# Patient Record
Sex: FEMALE | NOT HISPANIC OR LATINO | URBAN - METROPOLITAN AREA
[De-identification: names, ages, dates, MRNs, and addresses within clinical notes are randomized per-mention and may not be internally consistent; named-entity substitution may affect disease eponyms.]

---

## 2023-09-24 LAB — GROUP A STREP, PCR: NOT DETECTED

## 2024-07-23 ENCOUNTER — APPOINTMENT (OUTPATIENT)
Dept: PRIMARY CARE | Facility: CLINIC | Age: 49
End: 2024-07-23
Payer: COMMERCIAL

## 2024-07-23 VITALS
SYSTOLIC BLOOD PRESSURE: 138 MMHG | DIASTOLIC BLOOD PRESSURE: 90 MMHG | WEIGHT: 205 LBS | BODY MASS INDEX: 36.32 KG/M2 | HEIGHT: 63 IN

## 2024-07-23 DIAGNOSIS — R53.83 OTHER FATIGUE: ICD-10-CM

## 2024-07-23 DIAGNOSIS — D64.9 ANEMIA, UNSPECIFIED TYPE: ICD-10-CM

## 2024-07-23 DIAGNOSIS — Z13.220 LIPID SCREENING: ICD-10-CM

## 2024-07-23 DIAGNOSIS — E11.9 TYPE 2 DIABETES MELLITUS WITHOUT COMPLICATION, WITHOUT LONG-TERM CURRENT USE OF INSULIN (MULTI): ICD-10-CM

## 2024-07-23 DIAGNOSIS — R63.5 WEIGHT GAIN: ICD-10-CM

## 2024-07-23 DIAGNOSIS — R73.03 PRE-DIABETES: ICD-10-CM

## 2024-07-23 DIAGNOSIS — G47.30 SLEEP APNEA, UNSPECIFIED TYPE: ICD-10-CM

## 2024-07-23 DIAGNOSIS — Z12.31 VISIT FOR SCREENING MAMMOGRAM: Primary | ICD-10-CM

## 2024-07-23 DIAGNOSIS — Z83.49 FAMILY HISTORY OF HEMOCHROMATOSIS: ICD-10-CM

## 2024-07-23 PROCEDURE — 3075F SYST BP GE 130 - 139MM HG: CPT | Performed by: INTERNAL MEDICINE

## 2024-07-23 PROCEDURE — 99204 OFFICE O/P NEW MOD 45 MIN: CPT | Performed by: INTERNAL MEDICINE

## 2024-07-23 PROCEDURE — 3080F DIAST BP >= 90 MM HG: CPT | Performed by: INTERNAL MEDICINE

## 2024-07-23 PROCEDURE — 3008F BODY MASS INDEX DOCD: CPT | Performed by: INTERNAL MEDICINE

## 2024-07-23 ASSESSMENT — ENCOUNTER SYMPTOMS
OCCASIONAL FEELINGS OF UNSTEADINESS: 0
LOSS OF SENSATION IN FEET: 0
DEPRESSION: 0

## 2024-07-24 NOTE — PROGRESS NOTES
"Subjective   Patient ID: Corinne Padilla is a 49 y.o. female who presents for Follow-up (Multiple medical issues).    Corinne Padilla is a 49-year-old white lady today came here for multiple medical issues.  She snores at night, increased daytime somnolence, diabetes.  She took Wegovy for two months, lost seven pounds, but her insurance changed rule of the game.  She is unhappy with it.    IMMUNIZATION: Tetanus within the last 10 years.    I have personally reviewed the patient's Past Medical History, Medications, Allergies, Social History, and Family History in the EMR.    Review of Systems   All other systems reviewed and are negative.  The patient never had a heart attack, stroke, diabetes or caner.    Objective   /90   Ht 1.6 m (5' 3\")   Wt 93 kg (205 lb)   BMI 36.31 kg/m²     Physical Exam  Vitals reviewed.   HENT:      Right Ear: Tympanic membrane, ear canal and external ear normal.      Left Ear: Tympanic membrane, ear canal and external ear normal.   Eyes:      General: No scleral icterus.     Pupils: Pupils are equal, round, and reactive to light.   Neck:      Vascular: No carotid bruit.   Cardiovascular:      Heart sounds: Normal heart sounds, S1 normal and S2 normal. No murmur heard.     No friction rub.   Pulmonary:      Effort: Pulmonary effort is normal.      Breath sounds: Normal breath sounds and air entry.   Chest:      Comments: BREAST: Deferred by the patient.  Abdominal:      Palpations: There is no hepatomegaly, splenomegaly or mass.   Genitourinary:     Comments: VAGINAL: Deferred by the patient.  RECTAL: Deferred by the patient.  Musculoskeletal:         General: No swelling or deformity. Normal range of motion.      Cervical back: Neck supple.      Right lower leg: No edema.      Left lower leg: No edema.   Lymphadenopathy:      Cervical: No cervical adenopathy.      Upper Body:      Right upper body: No axillary adenopathy.      Left upper body: No axillary adenopathy.      Lower " Body: No right inguinal adenopathy. No left inguinal adenopathy.   Neurological:      Mental Status: She is oriented to person, place, and time.      Cranial Nerves: Cranial nerves 2-12 are intact. No cranial nerve deficit.      Sensory: No sensory deficit.      Motor: Motor function is intact. No weakness.      Gait: Gait is intact.      Deep Tendon Reflexes: Reflexes normal.   Psychiatric:         Mood and Affect: Mood normal. Mood is not anxious or depressed. Affect is not angry.         Behavior: Behavior is not agitated.         Thought Content: Thought content normal.         Judgment: Judgment normal.     LAB WORK: Laboratory testing discussed.    Assessment/Plan   Problem List Items Addressed This Visit    None  Visit Diagnoses         Codes    Visit for screening mammogram    -  Primary Z12.31    Relevant Orders    BI mammo bilateral screening tomosynthesis    Other fatigue     R53.83    Relevant Orders    CBC    Vitamin B12    Urinalysis with Reflex Microscopic    Thyroid Stimulating Hormone    Family history of hemochromatosis     Z83.49    Relevant Orders    Hemochromatosis Mutation    Lipid screening     Z13.220    Relevant Orders    Comprehensive Metabolic Panel    Lipid Panel    Pre-diabetes     R73.03    Relevant Orders    Hemoglobin A1C    BMI 36.0-36.9,adult     Z68.36    Relevant Orders    Vitamin D 25-Hydroxy,Total (for eval of Vitamin D levels)    Anemia, unspecified type     D64.9    Relevant Orders    Iron    Vitamin B12    Sleep apnea, unspecified type     G47.30    Weight gain     R63.5    Type 2 diabetes mellitus without complication, without long-term current use of insulin (Multi)     E11.9        1. Home sleep apnea.  Sleep apnea test ordered, home test.  2. Weight gain, tired, fatigued, exhausted.  I ordered fasting sugar level.  3. Family history of hemochromatosis.  Gene mutation ordered with [facility].  4. Complete blood work ordered.  5. Type 2 diabetes.  Hemoglobin  A1c.  6. Follow-up in a week after tests.  7. Welcome to my office.  8. Gynecological.  She will go for Pap test, mammogram.  Monitor.  9. Follow-up appointment with me in a week after testing.    Scribe Attestation  By signing my name below, I, Saima Rivera attest that this documentation has been prepared under the direction and in the presence of Kingston Beckett MD.

## 2024-07-26 ENCOUNTER — LAB (OUTPATIENT)
Dept: LAB | Facility: LAB | Age: 49
End: 2024-07-26
Payer: COMMERCIAL

## 2024-07-26 DIAGNOSIS — D64.9 ANEMIA, UNSPECIFIED TYPE: ICD-10-CM

## 2024-07-26 DIAGNOSIS — Z83.49 FAMILY HISTORY OF HEMOCHROMATOSIS: ICD-10-CM

## 2024-07-26 DIAGNOSIS — R73.03 PRE-DIABETES: ICD-10-CM

## 2024-07-26 DIAGNOSIS — R53.83 OTHER FATIGUE: ICD-10-CM

## 2024-07-26 DIAGNOSIS — Z13.220 LIPID SCREENING: ICD-10-CM

## 2024-07-26 LAB
25(OH)D3 SERPL-MCNC: 24 NG/ML (ref 30–100)
ALBUMIN SERPL BCP-MCNC: 4.7 G/DL (ref 3.4–5)
ALP SERPL-CCNC: 82 U/L (ref 33–110)
ALT SERPL W P-5'-P-CCNC: 39 U/L (ref 7–45)
ANION GAP SERPL CALC-SCNC: 18 MMOL/L (ref 10–20)
APPEARANCE UR: CLEAR
AST SERPL W P-5'-P-CCNC: 35 U/L (ref 9–39)
BILIRUB SERPL-MCNC: 0.5 MG/DL (ref 0–1.2)
BILIRUB UR STRIP.AUTO-MCNC: NEGATIVE MG/DL
BUN SERPL-MCNC: 10 MG/DL (ref 6–23)
CALCIUM SERPL-MCNC: 9.8 MG/DL (ref 8.6–10.6)
CHLORIDE SERPL-SCNC: 99 MMOL/L (ref 98–107)
CHOLEST SERPL-MCNC: 260 MG/DL (ref 0–199)
CHOLESTEROL/HDL RATIO: 4.9
CO2 SERPL-SCNC: 25 MMOL/L (ref 21–32)
COLOR UR: NORMAL
CREAT SERPL-MCNC: 0.74 MG/DL (ref 0.5–1.05)
EGFRCR SERPLBLD CKD-EPI 2021: >90 ML/MIN/1.73M*2
ERYTHROCYTE [DISTWIDTH] IN BLOOD BY AUTOMATED COUNT: 12.1 % (ref 11.5–14.5)
EST. AVERAGE GLUCOSE BLD GHB EST-MCNC: 177 MG/DL
GLUCOSE SERPL-MCNC: 182 MG/DL (ref 74–99)
GLUCOSE UR STRIP.AUTO-MCNC: NORMAL MG/DL
HBA1C MFR BLD: 7.8 %
HCT VFR BLD AUTO: 41 % (ref 36–46)
HDLC SERPL-MCNC: 53.3 MG/DL
HGB BLD-MCNC: 14 G/DL (ref 12–16)
IRON SERPL-MCNC: 124 UG/DL (ref 35–150)
KETONES UR STRIP.AUTO-MCNC: NEGATIVE MG/DL
LDLC SERPL CALC-MCNC: ABNORMAL MG/DL
LEUKOCYTE ESTERASE UR QL STRIP.AUTO: NEGATIVE
MCH RBC QN AUTO: 31 PG (ref 26–34)
MCHC RBC AUTO-ENTMCNC: 34.1 G/DL (ref 32–36)
MCV RBC AUTO: 91 FL (ref 80–100)
NITRITE UR QL STRIP.AUTO: NEGATIVE
NON HDL CHOLESTEROL: 207 MG/DL (ref 0–149)
NRBC BLD-RTO: 0 /100 WBCS (ref 0–0)
PH UR STRIP.AUTO: 5.5 [PH]
PLATELET # BLD AUTO: 368 X10*3/UL (ref 150–450)
POTASSIUM SERPL-SCNC: 4.6 MMOL/L (ref 3.5–5.3)
PROT SERPL-MCNC: 7.5 G/DL (ref 6.4–8.2)
PROT UR STRIP.AUTO-MCNC: NEGATIVE MG/DL
RBC # BLD AUTO: 4.52 X10*6/UL (ref 4–5.2)
RBC # UR STRIP.AUTO: NEGATIVE /UL
SODIUM SERPL-SCNC: 137 MMOL/L (ref 136–145)
SP GR UR STRIP.AUTO: 1.01
TRIGL SERPL-MCNC: 476 MG/DL (ref 0–149)
TSH SERPL-ACNC: 2.71 MIU/L (ref 0.44–3.98)
UROBILINOGEN UR STRIP.AUTO-MCNC: NORMAL MG/DL
VIT B12 SERPL-MCNC: 356 PG/ML (ref 211–911)
VLDL: ABNORMAL
WBC # BLD AUTO: 8 X10*3/UL (ref 4.4–11.3)

## 2024-07-26 PROCEDURE — 83540 ASSAY OF IRON: CPT

## 2024-07-26 PROCEDURE — 82306 VITAMIN D 25 HYDROXY: CPT

## 2024-07-26 PROCEDURE — 82607 VITAMIN B-12: CPT

## 2024-07-26 PROCEDURE — 85027 COMPLETE CBC AUTOMATED: CPT

## 2024-07-26 PROCEDURE — 81003 URINALYSIS AUTO W/O SCOPE: CPT

## 2024-07-26 PROCEDURE — 84443 ASSAY THYROID STIM HORMONE: CPT

## 2024-07-26 PROCEDURE — 80053 COMPREHEN METABOLIC PANEL: CPT

## 2024-07-26 PROCEDURE — 80061 LIPID PANEL: CPT

## 2024-07-26 PROCEDURE — 36415 COLL VENOUS BLD VENIPUNCTURE: CPT

## 2024-07-26 PROCEDURE — 83036 HEMOGLOBIN GLYCOSYLATED A1C: CPT

## 2024-07-26 PROCEDURE — 81256 HFE GENE: CPT

## 2024-07-29 ENCOUNTER — HOSPITAL ENCOUNTER (OUTPATIENT)
Dept: RADIOLOGY | Facility: CLINIC | Age: 49
Discharge: HOME | End: 2024-07-29
Payer: COMMERCIAL

## 2024-07-29 ENCOUNTER — APPOINTMENT (OUTPATIENT)
Dept: PRIMARY CARE | Facility: CLINIC | Age: 49
End: 2024-07-29
Payer: COMMERCIAL

## 2024-07-29 VITALS — BODY MASS INDEX: 36.32 KG/M2 | WEIGHT: 205 LBS | HEIGHT: 63 IN

## 2024-07-29 VITALS
SYSTOLIC BLOOD PRESSURE: 130 MMHG | HEIGHT: 63 IN | DIASTOLIC BLOOD PRESSURE: 86 MMHG | BODY MASS INDEX: 36.14 KG/M2 | WEIGHT: 204 LBS

## 2024-07-29 DIAGNOSIS — Z12.31 VISIT FOR SCREENING MAMMOGRAM: ICD-10-CM

## 2024-07-29 DIAGNOSIS — E66.3 OVERWEIGHT: Primary | ICD-10-CM

## 2024-07-29 DIAGNOSIS — E11.9 TYPE 2 DIABETES MELLITUS WITHOUT COMPLICATION, WITHOUT LONG-TERM CURRENT USE OF INSULIN (MULTI): ICD-10-CM

## 2024-07-29 DIAGNOSIS — E83.110 HEREDITARY HEMOCHROMATOSIS (CMS-HCC): ICD-10-CM

## 2024-07-29 PROBLEM — F32.A DEPRESSIVE DISORDER: Status: ACTIVE | Noted: 2024-07-29

## 2024-07-29 PROBLEM — E66.9 OBESITY WITH BODY MASS INDEX 30 OR GREATER: Status: ACTIVE | Noted: 2024-07-29

## 2024-07-29 PROBLEM — R87.810 HIGH-RISK HUMAN PAPILLOMAVIRUS (HPV) DNA DETECTED IN CERVICAL SPECIMEN: Status: ACTIVE | Noted: 2024-07-29

## 2024-07-29 PROBLEM — N87.0 CERVICAL INTRAEPITHELIAL NEOPLASIA GRADE 1: Status: ACTIVE | Noted: 2024-07-29

## 2024-07-29 LAB
ELECTRONICALLY SIGNED BY: ABNORMAL
HFE GENE MUT TESTED BLD/T: ABNORMAL
HFE P.C282Y BLD/T QL: ABNORMAL
HFE P.H63D BLD/T QL: ABNORMAL

## 2024-07-29 PROCEDURE — 3079F DIAST BP 80-89 MM HG: CPT | Performed by: INTERNAL MEDICINE

## 2024-07-29 PROCEDURE — 77067 SCR MAMMO BI INCL CAD: CPT

## 2024-07-29 PROCEDURE — 99214 OFFICE O/P EST MOD 30 MIN: CPT | Performed by: INTERNAL MEDICINE

## 2024-07-29 PROCEDURE — 77067 SCR MAMMO BI INCL CAD: CPT | Performed by: STUDENT IN AN ORGANIZED HEALTH CARE EDUCATION/TRAINING PROGRAM

## 2024-07-29 PROCEDURE — 3008F BODY MASS INDEX DOCD: CPT | Performed by: INTERNAL MEDICINE

## 2024-07-29 PROCEDURE — 3051F HG A1C>EQUAL 7.0%<8.0%: CPT | Performed by: INTERNAL MEDICINE

## 2024-07-29 PROCEDURE — 3075F SYST BP GE 130 - 139MM HG: CPT | Performed by: INTERNAL MEDICINE

## 2024-07-29 PROCEDURE — 77063 BREAST TOMOSYNTHESIS BI: CPT | Performed by: STUDENT IN AN ORGANIZED HEALTH CARE EDUCATION/TRAINING PROGRAM

## 2024-07-29 RX ORDER — LANCETS
EACH MISCELLANEOUS
Qty: 200 EACH | Refills: 3 | Status: SHIPPED | OUTPATIENT
Start: 2024-07-29

## 2024-07-29 RX ORDER — INSULIN PUMP SYRINGE, 3 ML
EACH MISCELLANEOUS
Qty: 1 EACH | Refills: 0 | Status: SHIPPED | OUTPATIENT
Start: 2024-07-29 | End: 2025-07-29

## 2024-07-29 RX ORDER — BLOOD SUGAR DIAGNOSTIC
STRIP MISCELLANEOUS
Qty: 200 EACH | Refills: 3 | Status: SHIPPED | OUTPATIENT
Start: 2024-07-29

## 2024-07-29 ASSESSMENT — ENCOUNTER SYMPTOMS
OCCASIONAL FEELINGS OF UNSTEADINESS: 0
LOSS OF SENSATION IN FEET: 0
DEPRESSION: 0

## 2024-07-30 DIAGNOSIS — E11.9 TYPE 2 DIABETES MELLITUS WITHOUT COMPLICATION, WITHOUT LONG-TERM CURRENT USE OF INSULIN (MULTI): ICD-10-CM

## 2024-07-30 RX ORDER — BLOOD-GLUCOSE,RECEIVER,CONT
EACH MISCELLANEOUS
Qty: 1 EACH | Refills: 0 | Status: SHIPPED | OUTPATIENT
Start: 2024-07-30

## 2024-07-30 RX ORDER — BLOOD-GLUCOSE SENSOR
EACH MISCELLANEOUS
Qty: 5 EACH | Refills: 0 | Status: SHIPPED | OUTPATIENT
Start: 2024-07-30

## 2024-07-30 NOTE — PROGRESS NOTES
"Subjective   Patient ID: Corinne Elizondo is a 49 y.o. female who presents for multiple medical issues..    Ms. Elizondo today came here for multiple medical issues.  She came here with her .  1. She gained weight ______ diabetic.  She is concerned.  Polydipsia and polyuria.  Not feeling good.  2. Follow-up on other conditions.  Her mother has hemochromatosis.  She was diagnosed.    I have personally reviewed the patient's Past Medical History, Medications, Allergies, Social History, and Family History in the EMR.    Review of Systems   All other systems reviewed and are negative.    Objective   /86   Ht 1.6 m (5' 3\")   Wt 92.5 kg (204 lb)   BMI 36.14 kg/m²     Physical Exam  Vitals reviewed.   Cardiovascular:      Heart sounds: Normal heart sounds, S1 normal and S2 normal. No murmur heard.     No friction rub.   Pulmonary:      Effort: Pulmonary effort is normal.      Breath sounds: Normal breath sounds and air entry.   Abdominal:      Palpations: There is no hepatomegaly, splenomegaly or mass.   Musculoskeletal:      Right lower leg: No edema.      Left lower leg: No edema.   Lymphadenopathy:      Lower Body: No right inguinal adenopathy. No left inguinal adenopathy.   Neurological:      Cranial Nerves: Cranial nerves 2-12 are intact.      Sensory: No sensory deficit.      Motor: Motor function is intact.      Deep Tendon Reflexes: Reflexes are normal and symmetric.     LAB WORK:  Laboratory testing discussed.    Assessment/Plan   Problem List Items Addressed This Visit    None  Visit Diagnoses         Codes    Overweight    -  Primary E66.3    Hereditary hemochromatosis (CMS-HCC)     E83.110    Relevant Medications    lancets misc    Blood glucose monitoring meter kit kit    FreeStyle Test strip    semaglutide 0.25 mg or 0.5 mg (2 mg/3 mL) pen injector (Start on 8/4/2024)    Other Relevant Orders    Referral to Hematology and Oncology    Type 2 diabetes mellitus without complication, without " long-term current use of insulin (Multi)     E11.9    Relevant Medications    lancets misc    Blood glucose monitoring meter kit kit    FreeStyle Test strip    semaglutide 0.25 mg or 0.5 mg (2 mg/3 mL) pen injector (Start on 8/4/2024)    Other Relevant Orders    Referral to Hematology and Oncology        1. Hemochromatosis, heterozygous.  Refer to hematologist.  2. Type 2 diabetes.  She tried diet and exercise.  Metformin did not work.  She wants to try Ozempic to lose weight.  I am going to call her Ozempic.  3. Overweight.  Diet and exercise.  4. Follow-up appointment with me in four weeks’ time.    Scribe Attestation  By signing my name below, I, Saima Azar attest that this documentation has been prepared under the direction and in the presence of Kingston Beckett MD.

## 2024-08-07 ENCOUNTER — OFFICE VISIT (OUTPATIENT)
Dept: PRIMARY CARE | Facility: CLINIC | Age: 49
End: 2024-08-07
Payer: COMMERCIAL

## 2024-08-07 VITALS
SYSTOLIC BLOOD PRESSURE: 128 MMHG | WEIGHT: 204 LBS | BODY MASS INDEX: 36.14 KG/M2 | HEIGHT: 63 IN | DIASTOLIC BLOOD PRESSURE: 74 MMHG

## 2024-08-07 DIAGNOSIS — E11.9 TYPE 2 DIABETES MELLITUS WITHOUT COMPLICATION, WITHOUT LONG-TERM CURRENT USE OF INSULIN (MULTI): ICD-10-CM

## 2024-08-07 DIAGNOSIS — E55.9 VITAMIN D DEFICIENCY: Primary | ICD-10-CM

## 2024-08-07 PROCEDURE — 3078F DIAST BP <80 MM HG: CPT | Performed by: INTERNAL MEDICINE

## 2024-08-07 PROCEDURE — 3074F SYST BP LT 130 MM HG: CPT | Performed by: INTERNAL MEDICINE

## 2024-08-07 PROCEDURE — 3051F HG A1C>EQUAL 7.0%<8.0%: CPT | Performed by: INTERNAL MEDICINE

## 2024-08-07 PROCEDURE — 3008F BODY MASS INDEX DOCD: CPT | Performed by: INTERNAL MEDICINE

## 2024-08-07 PROCEDURE — 99213 OFFICE O/P EST LOW 20 MIN: CPT | Performed by: INTERNAL MEDICINE

## 2024-08-07 ASSESSMENT — ENCOUNTER SYMPTOMS
DEPRESSION: 0
LOSS OF SENSATION IN FEET: 0
OCCASIONAL FEELINGS OF UNSTEADINESS: 0

## 2024-08-08 NOTE — PROGRESS NOTES
"Subjective   Patient ID: Corinne Elizondo is a 49 y.o. female who presents for type 2 diabetes.    Ms. Corinne Elizondo today came here for multiple medical issues.  She is type 2 diabetic.  Her insurance will not cover Ozempic, but she came for Rybelsus, she wants to try it.  We did discuss option of metformin, but because of diarrhea and her job, she does not want to take a chance.  She wants to go on Rybelsus.  She has taken Wegovy last year and she was very happy with that.  She came for follow-up on various conditions.    I have personally reviewed the patient's Past Medical History, Medications, Allergies, Social History, and Family History in the EMR.    Review of Systems   All other systems reviewed and are negative.    Objective   /74   Ht 1.6 m (5' 3\")   Wt 92.5 kg (204 lb)   BMI 36.14 kg/m²     Physical Exam  Vitals reviewed.   Cardiovascular:      Heart sounds: Normal heart sounds, S1 normal and S2 normal. No murmur heard.     No friction rub.   Pulmonary:      Effort: Pulmonary effort is normal.      Breath sounds: Normal breath sounds and air entry.   Abdominal:      Palpations: There is no hepatomegaly, splenomegaly or mass.   Musculoskeletal:      Right lower leg: No edema.      Left lower leg: No edema.   Lymphadenopathy:      Lower Body: No right inguinal adenopathy. No left inguinal adenopathy.   Neurological:      Cranial Nerves: Cranial nerves 2-12 are intact.      Sensory: No sensory deficit.      Motor: Motor function is intact.      Deep Tendon Reflexes: Reflexes are normal and symmetric.     LAB WORK: Laboratory testing discussed.    Assessment/Plan   Problem List Items Addressed This Visit    None  Visit Diagnoses         Codes    Vitamin D deficiency    -  Primary E55.9    Type 2 diabetes mellitus without complication, without long-term current use of insulin (Multi)     E11.9    Relevant Medications    semaglutide (Rybelsus) 3 mg tablet    semaglutide (Rybelsus) 3 mg tablet      "   1. Type 2 diabetes and weight gain.  Insurance approved Rybelsus, given Rybelsus.  We talked about probably metformin.  Because of diarrhea and stuff, she does not want to take.  2. Low vitamin D.  Given.  3. Correct method given, sample dispensed.  4. I shall see her back in a month.    Scribe Attestation  By signing my name below, I, Saima Azar attest that this documentation has been prepared under the direction and in the presence of Kingston Beckett MD.

## 2024-08-09 DIAGNOSIS — E11.9 TYPE 2 DIABETES MELLITUS WITHOUT COMPLICATION, WITHOUT LONG-TERM CURRENT USE OF INSULIN (MULTI): ICD-10-CM

## 2024-08-27 ENCOUNTER — APPOINTMENT (OUTPATIENT)
Dept: PRIMARY CARE | Facility: CLINIC | Age: 49
End: 2024-08-27
Payer: COMMERCIAL

## 2024-09-04 ENCOUNTER — APPOINTMENT (OUTPATIENT)
Dept: PRIMARY CARE | Facility: CLINIC | Age: 49
End: 2024-09-04
Payer: COMMERCIAL

## 2024-09-04 DIAGNOSIS — E11.9 TYPE 2 DIABETES MELLITUS WITHOUT COMPLICATION, WITHOUT LONG-TERM CURRENT USE OF INSULIN (MULTI): ICD-10-CM

## 2024-09-13 ENCOUNTER — APPOINTMENT (OUTPATIENT)
Dept: PRIMARY CARE | Facility: CLINIC | Age: 49
End: 2024-09-13
Payer: COMMERCIAL

## 2024-09-20 ENCOUNTER — APPOINTMENT (OUTPATIENT)
Dept: PRIMARY CARE | Facility: CLINIC | Age: 49
End: 2024-09-20
Payer: COMMERCIAL

## 2024-10-24 ENCOUNTER — APPOINTMENT (OUTPATIENT)
Dept: PRIMARY CARE | Facility: CLINIC | Age: 49
End: 2024-10-24
Payer: COMMERCIAL

## 2024-11-07 ENCOUNTER — TELEPHONE (OUTPATIENT)
Dept: HEMATOLOGY/ONCOLOGY | Facility: CLINIC | Age: 49
End: 2024-11-07

## 2024-11-08 ENCOUNTER — TELEPHONE (OUTPATIENT)
Dept: HEMATOLOGY/ONCOLOGY | Facility: CLINIC | Age: 49
End: 2024-11-08
Payer: COMMERCIAL

## 2024-11-13 ENCOUNTER — APPOINTMENT (OUTPATIENT)
Dept: HEMATOLOGY/ONCOLOGY | Facility: CLINIC | Age: 49
End: 2024-11-13
Payer: COMMERCIAL

## 2024-11-13 DIAGNOSIS — E83.110 HEREDITARY HEMOCHROMATOSIS (CMS-HCC): Primary | ICD-10-CM

## 2024-11-22 ENCOUNTER — HOSPITAL ENCOUNTER (OUTPATIENT)
Dept: CARDIOLOGY | Facility: HOSPITAL | Age: 49
Discharge: HOME | End: 2024-11-22
Payer: COMMERCIAL

## 2024-11-22 ENCOUNTER — HOSPITAL ENCOUNTER (OUTPATIENT)
Dept: RADIOLOGY | Facility: HOSPITAL | Age: 49
Discharge: HOME | End: 2024-11-22
Payer: COMMERCIAL

## 2024-11-22 DIAGNOSIS — E83.110 HEREDITARY HEMOCHROMATOSIS (CMS-HCC): ICD-10-CM

## 2024-11-22 LAB
AORTIC VALVE PEAK VELOCITY: 1.05 M/S
AV PEAK GRADIENT: 4 MMHG
AVA (PEAK VEL): 2.81 CM2
EJECTION FRACTION APICAL 4 CHAMBER: 60.3
EJECTION FRACTION: 65 %
LEFT ATRIUM VOLUME AREA LENGTH INDEX BSA: 18 ML/M2
LEFT VENTRICLE INTERNAL DIMENSION DIASTOLE: 3.6 CM (ref 3.5–6)
LEFT VENTRICULAR OUTFLOW TRACT DIAMETER: 2.1 CM
MITRAL VALVE E/A RATIO: 0.91
RIGHT VENTRICLE FREE WALL PEAK S': 11.2 CM/S

## 2024-11-22 PROCEDURE — 2550000001 HC RX 255 CONTRASTS: Performed by: INTERNAL MEDICINE

## 2024-11-22 PROCEDURE — 93306 TTE W/DOPPLER COMPLETE: CPT

## 2024-11-22 PROCEDURE — 74183 MRI ABD W/O CNTR FLWD CNTR: CPT

## 2024-11-22 PROCEDURE — 93306 TTE W/DOPPLER COMPLETE: CPT | Performed by: INTERNAL MEDICINE

## 2024-11-22 PROCEDURE — A9575 INJ GADOTERATE MEGLUMI 0.1ML: HCPCS | Performed by: INTERNAL MEDICINE

## 2024-11-22 RX ORDER — GADOTERATE MEGLUMINE 376.9 MG/ML
19 INJECTION INTRAVENOUS
Status: COMPLETED | OUTPATIENT
Start: 2024-11-22 | End: 2024-11-22

## 2024-12-05 ENCOUNTER — TELEPHONE (OUTPATIENT)
Dept: HEMATOLOGY/ONCOLOGY | Facility: CLINIC | Age: 49
End: 2024-12-05
Payer: COMMERCIAL

## 2024-12-06 ENCOUNTER — OFFICE VISIT (OUTPATIENT)
Dept: HEMATOLOGY/ONCOLOGY | Facility: CLINIC | Age: 49
End: 2024-12-06
Payer: COMMERCIAL

## 2024-12-06 ENCOUNTER — LAB (OUTPATIENT)
Dept: LAB | Facility: CLINIC | Age: 49
End: 2024-12-06
Payer: COMMERCIAL

## 2024-12-06 VITALS
RESPIRATION RATE: 18 BRPM | SYSTOLIC BLOOD PRESSURE: 146 MMHG | BODY MASS INDEX: 37.65 KG/M2 | HEIGHT: 62 IN | OXYGEN SATURATION: 98 % | DIASTOLIC BLOOD PRESSURE: 97 MMHG | TEMPERATURE: 97 F | HEART RATE: 96 BPM | WEIGHT: 204.59 LBS

## 2024-12-06 DIAGNOSIS — E83.110 HEREDITARY HEMOCHROMATOSIS (CMS-HCC): ICD-10-CM

## 2024-12-06 DIAGNOSIS — E83.119 HEMOCHROMATOSIS, UNSPECIFIED HEMOCHROMATOSIS TYPE: Primary | ICD-10-CM

## 2024-12-06 DIAGNOSIS — E11.9 TYPE 2 DIABETES MELLITUS WITHOUT COMPLICATION, WITHOUT LONG-TERM CURRENT USE OF INSULIN (MULTI): ICD-10-CM

## 2024-12-06 DIAGNOSIS — J40 BRONCHITIS: ICD-10-CM

## 2024-12-06 DIAGNOSIS — E83.119 HEMOCHROMATOSIS, UNSPECIFIED HEMOCHROMATOSIS TYPE: ICD-10-CM

## 2024-12-06 LAB
ALBUMIN SERPL BCP-MCNC: 4.5 G/DL (ref 3.4–5)
ALP SERPL-CCNC: 76 U/L (ref 33–110)
ALT SERPL W P-5'-P-CCNC: 35 U/L (ref 7–45)
ANION GAP SERPL CALC-SCNC: 14 MMOL/L (ref 10–20)
AST SERPL W P-5'-P-CCNC: 39 U/L (ref 9–39)
BASOPHILS # BLD AUTO: 0.03 X10*3/UL (ref 0–0.1)
BASOPHILS NFR BLD AUTO: 0.3 %
BILIRUB SERPL-MCNC: 0.4 MG/DL (ref 0–1.2)
BUN SERPL-MCNC: 12 MG/DL (ref 6–23)
CALCIUM SERPL-MCNC: 9.6 MG/DL (ref 8.6–10.3)
CHLORIDE SERPL-SCNC: 101 MMOL/L (ref 98–107)
CO2 SERPL-SCNC: 26 MMOL/L (ref 21–32)
CREAT SERPL-MCNC: 0.76 MG/DL (ref 0.5–1.05)
EGFRCR SERPLBLD CKD-EPI 2021: >90 ML/MIN/1.73M*2
EOSINOPHIL # BLD AUTO: 0.2 X10*3/UL (ref 0–0.7)
EOSINOPHIL NFR BLD AUTO: 2.3 %
ERYTHROCYTE [DISTWIDTH] IN BLOOD BY AUTOMATED COUNT: 12.1 % (ref 11.5–14.5)
EST. AVERAGE GLUCOSE BLD GHB EST-MCNC: 174 MG/DL
GLUCOSE SERPL-MCNC: 230 MG/DL (ref 74–99)
HBA1C MFR BLD: 7.7 %
HCT VFR BLD AUTO: 40.5 % (ref 36–46)
HGB BLD-MCNC: 14.1 G/DL (ref 12–16)
IMM GRANULOCYTES # BLD AUTO: 0.03 X10*3/UL (ref 0–0.7)
IMM GRANULOCYTES NFR BLD AUTO: 0.3 % (ref 0–0.9)
LYMPHOCYTES # BLD AUTO: 2.56 X10*3/UL (ref 1.2–4.8)
LYMPHOCYTES NFR BLD AUTO: 28.8 %
MCH RBC QN AUTO: 32.1 PG (ref 26–34)
MCHC RBC AUTO-ENTMCNC: 34.8 G/DL (ref 32–36)
MCV RBC AUTO: 92 FL (ref 80–100)
MONOCYTES # BLD AUTO: 0.63 X10*3/UL (ref 0.1–1)
MONOCYTES NFR BLD AUTO: 7.1 %
NEUTROPHILS # BLD AUTO: 5.43 X10*3/UL (ref 1.2–7.7)
NEUTROPHILS NFR BLD AUTO: 61.2 %
NRBC BLD-RTO: 0 /100 WBCS (ref 0–0)
PLATELET # BLD AUTO: 349 X10*3/UL (ref 150–450)
POTASSIUM SERPL-SCNC: 3.9 MMOL/L (ref 3.5–5.3)
PROT SERPL-MCNC: 7.5 G/DL (ref 6.4–8.2)
RBC # BLD AUTO: 4.39 X10*6/UL (ref 4–5.2)
SODIUM SERPL-SCNC: 137 MMOL/L (ref 136–145)
WBC # BLD AUTO: 8.9 X10*3/UL (ref 4.4–11.3)

## 2024-12-06 PROCEDURE — 80053 COMPREHEN METABOLIC PANEL: CPT

## 2024-12-06 PROCEDURE — 82746 ASSAY OF FOLIC ACID SERUM: CPT

## 2024-12-06 PROCEDURE — 85025 COMPLETE CBC W/AUTO DIFF WBC: CPT

## 2024-12-06 PROCEDURE — 83540 ASSAY OF IRON: CPT

## 2024-12-06 PROCEDURE — 99215 OFFICE O/P EST HI 40 MIN: CPT | Performed by: INTERNAL MEDICINE

## 2024-12-06 PROCEDURE — 3008F BODY MASS INDEX DOCD: CPT | Performed by: INTERNAL MEDICINE

## 2024-12-06 PROCEDURE — 99205 OFFICE O/P NEW HI 60 MIN: CPT | Performed by: INTERNAL MEDICINE

## 2024-12-06 PROCEDURE — 83036 HEMOGLOBIN GLYCOSYLATED A1C: CPT

## 2024-12-06 PROCEDURE — 84075 ASSAY ALKALINE PHOSPHATASE: CPT

## 2024-12-06 PROCEDURE — 3077F SYST BP >= 140 MM HG: CPT | Performed by: INTERNAL MEDICINE

## 2024-12-06 PROCEDURE — 82105 ALPHA-FETOPROTEIN SERUM: CPT

## 2024-12-06 PROCEDURE — 3080F DIAST BP >= 90 MM HG: CPT | Performed by: INTERNAL MEDICINE

## 2024-12-06 PROCEDURE — 36415 COLL VENOUS BLD VENIPUNCTURE: CPT

## 2024-12-06 PROCEDURE — 82607 VITAMIN B-12: CPT

## 2024-12-06 PROCEDURE — 82728 ASSAY OF FERRITIN: CPT

## 2024-12-06 PROCEDURE — 3051F HG A1C>EQUAL 7.0%<8.0%: CPT | Performed by: INTERNAL MEDICINE

## 2024-12-06 PROCEDURE — 84443 ASSAY THYROID STIM HORMONE: CPT

## 2024-12-06 RX ORDER — AMOXICILLIN AND CLAVULANATE POTASSIUM 875; 125 MG/1; MG/1
875 TABLET, FILM COATED ORAL 2 TIMES DAILY
Qty: 14 TABLET | Refills: 0 | Status: SHIPPED | OUTPATIENT
Start: 2024-12-06 | End: 2024-12-13

## 2024-12-06 ASSESSMENT — PATIENT HEALTH QUESTIONNAIRE - PHQ9
2. FEELING DOWN, DEPRESSED OR HOPELESS: NOT AT ALL
1. LITTLE INTEREST OR PLEASURE IN DOING THINGS: NOT AT ALL
SUM OF ALL RESPONSES TO PHQ9 QUESTIONS 1 AND 2: 0

## 2024-12-06 ASSESSMENT — ENCOUNTER SYMPTOMS
LOSS OF SENSATION IN FEET: 1
DEPRESSION: 0
OCCASIONAL FEELINGS OF UNSTEADINESS: 0

## 2024-12-06 ASSESSMENT — COLUMBIA-SUICIDE SEVERITY RATING SCALE - C-SSRS
1. IN THE PAST MONTH, HAVE YOU WISHED YOU WERE DEAD OR WISHED YOU COULD GO TO SLEEP AND NOT WAKE UP?: NO
2. HAVE YOU ACTUALLY HAD ANY THOUGHTS OF KILLING YOURSELF?: NO
6. HAVE YOU EVER DONE ANYTHING, STARTED TO DO ANYTHING, OR PREPARED TO DO ANYTHING TO END YOUR LIFE?: NO

## 2024-12-06 ASSESSMENT — PAIN SCALES - GENERAL: PAINLEVEL_OUTOF10: 1

## 2024-12-06 NOTE — PROGRESS NOTES
Pt seen in office today for a new patient visit with Dr. Pepper Chacon for management of her hereditary hemochromatosis. She has been referred to our office by Dr. Beckett. She is  without complaints today and denies pain.     Medications, pharmacy preference and allergies were reviewed with patient and updated in the medical record by MD.     Per orders, ;abs were obtained today prior to her visit. She has had an echo and MRI of the liver on 11/22/24 and these results are to be reviewed in office today by MD with patient. She is to have a telelhealth visit on Monday to review today's lab results and will RTC in 6 months with labs prior to that visit. She will also have an MRI of the liver again in 1 year.    Our contact information was given to patient and they were encouraged to contact us with any questions or concerns.     Patient verbalized understanding and agreement regarding discussed information via verbal feedback. Pt escorted to scheduling.

## 2024-12-06 NOTE — PROGRESS NOTES
Patient ID: Corinne Elizondo is a 49 y.o. female.  Referring Physician: Kingston Beckett MD  [address]  [facility], [address]  [place],  [place] [address]  Primary Care Provider: Kingston Beckett MD  Referral Reason: hemochromatosis    Subjective:  Arhtralgia and weight gain , no other complaints    Heme/Onc History:  Her mother who is a patient of mine who has hemochromatosis        Past Medical History:   Past Medical History:   Diagnosis Date    Overweight     little     Social History:   Social History     Socioeconomic History    Marital status:      Spouse name: Not on file    Number of children: 3    Years of education: Not on file    Highest education level: Not on file   Occupational History     Comment: She works for ______.   Tobacco Use    Smoking status: Never    Smokeless tobacco: Not on file   Substance and Sexual Activity    Alcohol use: Never    Drug use: Not on file    Sexual activity: Not on file   Other Topics Concern    Not on file   Social History Narrative    Not on file     Social Drivers of Health     Financial Resource Strain: Patient Declined (4/6/2023)    Received from [facility]    Overall Financial Resource Strain (CARDIA)     Difficulty of Paying Living Expenses: Patient declined   Food Insecurity: Patient Declined (4/6/2023)    Received from [facility]    Hunger Vital Sign     Worried About Running Out of Food in the Last Year: Patient declined     Ran Out of Food in the Last Year: Patient declined   Transportation Needs: No Transportation Needs (4/6/2023)    Received from [facility]    PRAPARE - Transportation     Lack of Transportation (Medical): No     Lack of Transportation (Non-Medical): No   Physical Activity: Insufficiently Active (4/6/2023)    Received from [facility]    Exercise Vital Sign     Days of Exercise per Week: 3 days     Minutes of Exercise per  "Session: 20 min   Stress: Stress Concern Present (4/6/2023)    [facility]    [place] [employer] - Occupational Stress Questionnaire     Feeling of Stress : To some extent   Social Connections: Moderately Isolated (4/6/2023)    Received from [facility]    Social Connection and Isolation Panel [NHANES]     Frequency of Communication with Friends and Family: Twice a week     Frequency of Social Gatherings with Friends and Family: Once a week     Attends Anglican Services: Never     Active Member of Clubs or Organizations: No     Attends Club or Organization Meetings: Patient declined     Marital Status:    Intimate Partner Violence: Not on file   Housing Stability: Unknown (4/6/2023)    Received from [facility]    Housing Stability Vital Sign     Unable to Pay for Housing in the Last Year: Patient refused     Number of Places Lived in the Last Year: Not on file     Unstable Housing in the Last Year: No     Surgical History:   Past Surgical History:   Procedure Laterality Date    FETAL SURGERY FOR CONGENITAL HERNIA       Family History:   Family History   Problem Relation Name Age of Onset    Other (heterozygous hemochromatosis.) Mother        reports that she has never smoked. She does not have any smokeless tobacco history on file.  Oncology Family history: Cancer-related family history is not on file.    Review Of Systems:  As stated per in HPI; otherwise all other 12 point ROS are negative    Physical Exam:  BP (!) 146/97 (BP Location: Left arm, Patient Position: Sitting, BP Cuff Size: Large adult)   Pulse 96   Temp 36.1 °C (97 °F) (Temporal)   Resp 18   Ht 1.578 m (5' 2.13\")   Wt 92.8 kg (204 lb 9.4 oz)   SpO2 98%   BMI 37.27 kg/m²   BSA: 2.02 meters squared  General: awake/alert/oriented x3, no distress, alert and cooperative  Head: Short hair fully covering scalp. Symmetric facial " expressions  Eyes: PERRL, EOMI, clear sclera, eyebrows present.  Ears/Nose/Mouth/Throat:  Oral mucous membranes moist. No oral ulcers. No palpable pre/post-auricular lymph nodes  Neck: No palpable cervical chain lymph nodes  Respiratory: unlabored breathing on room air, good chest expansion, thorax symmetric  Cardio: Regular rate and rhythm, normal S1 and S2, radial pulses symmetric  GI: Nondistended, soft, non-tender abdomen  Musculoskeletal: Normal muscle bulk and tone, ROM intact, no joint swelling.  Rises from chair and walks unassisted.  Extremities: No ankle swelling, no arm or leg wounds  Neuro: Alert, cognition intact, speech normal. Facial expressions symmetric.  No motor deficits noted. Sensation intact to touch and hot/cold.   Able to stand from seated position unassisted and walks around the room unassisted.  Psychological: Appropriate mood and behavior.  Skin: Warm and dry, no lesions, no rashes    Results:  Diagnostic Results   Lab Results   Component Value Date    WBC 8.9 12/06/2024    HGB 14.1 12/06/2024    HCT 40.5 12/06/2024    MCV 92 12/06/2024     12/06/2024     Lab Results   Component Value Date    CALCIUM 9.8 07/26/2024     07/26/2024    K 4.6 07/26/2024    CO2 25 07/26/2024    CL 99 07/26/2024    BUN 10 07/26/2024    CREATININE 0.74 07/26/2024    ALT 39 07/26/2024    AST 35 07/26/2024       Current Outpatient Medications:     Blood glucose monitoring meter kit kit, Test twice daily, Disp: 1 each, Rfl: 0    FreeStyle Opal 3 [place] misc, Use as instructed, Disp: 1 each, Rfl: 0    FreeStyle Opal 3 Sensor device, Test twice daily, Disp: 5 each, Rfl: 0    FreeStyle Test strip, Test twice daily, Disp: 200 each, Rfl: 3    lancets misc, Test twice daily, Disp: 200 each, Rfl: 3    semaglutide (Rybelsus) 3 mg tablet, Take 1 tablet (3 mg) by mouth once daily., Disp: 30 tablet, Rfl: 0    semaglutide (Rybelsus) 3 mg tablet, Take 1 tablet (3 mg) by mouth once daily., Disp: 30 tablet, Rfl: 0     semaglutide 0.25 mg or 0.5 mg (2 mg/3 mL) pen injector, Inject 0.5 mg under the skin 1 (one) time per week., Disp: 3 mL, Rfl: 0     Radiology:    Pathology:    Assessment/Plan:  ? Hemochroamtosis:    MRI liver and echo does not show iron overload.    Iron panel from today is pending. Will call her on Monday and discuss if she needs phlebotomy or not or if she has to avoid red meat altogether. Currently, she eats meat 1-2 times per week    2- Fatty liver: liver is 22 cm. Severe steatohepatosis. Weight loss is encouraged. Will talk to her PCP in January about weight loss inj. Decline hepatology referral politely.    3- Pancreas cyst: 5 mm. Likely benign. She is concerned about IPMN. Will check MRI in 1 year (12/25). Her mother's half brother has pancreas cancer but no other FHX of cancers.    RTC in 6 months with labs    Diagnoses and all orders for this visit:  Hemochromatosis, unspecified hemochromatosis type  -     Iron and TIBC; Future  -     Vitamin B12; Future  -     Ferritin; Future  -     Comprehensive Metabolic Panel; Future  -     Folate; Future  -     CBC and Auto Differential; Future  Hereditary hemochromatosis (CMS-HCC)  -     Referral to Hematology and Oncology  Type 2 diabetes mellitus without complication, without long-term current use of insulin (Multi)  -     Referral to Hematology and Oncology       Performance Status: Asymptomatic    I spent more than 60 minutes for the patient today, including face-to-face conversation, pre-visit preparation, post-visit orders, and others.   Pepper Chacon MD

## 2024-12-07 LAB
AFP SERPL-MCNC: <4 NG/ML (ref 0–9)
FERRITIN SERPL-MCNC: 519 NG/ML (ref 8–150)
FOLATE SERPL-MCNC: 13.2 NG/ML
IRON SATN MFR SERPL: 29 % (ref 25–45)
IRON SERPL-MCNC: 114 UG/DL (ref 35–150)
TIBC SERPL-MCNC: 396 UG/DL (ref 240–445)
TSH SERPL-ACNC: 1.55 MIU/L (ref 0.44–3.98)
UIBC SERPL-MCNC: 282 UG/DL (ref 110–370)
VIT B12 SERPL-MCNC: 449 PG/ML (ref 211–911)

## 2024-12-09 ENCOUNTER — TELEMEDICINE (OUTPATIENT)
Dept: HEMATOLOGY/ONCOLOGY | Facility: CLINIC | Age: 49
End: 2024-12-09
Payer: COMMERCIAL

## 2024-12-09 DIAGNOSIS — E11.9 TYPE 2 DIABETES MELLITUS WITHOUT COMPLICATION, WITHOUT LONG-TERM CURRENT USE OF INSULIN (MULTI): ICD-10-CM

## 2024-12-09 DIAGNOSIS — E83.110 HEREDITARY HEMOCHROMATOSIS (CMS-HCC): ICD-10-CM

## 2024-12-09 DIAGNOSIS — E83.119 HEMOCHROMATOSIS, UNSPECIFIED HEMOCHROMATOSIS TYPE: Primary | ICD-10-CM

## 2024-12-09 PROCEDURE — 99215 OFFICE O/P EST HI 40 MIN: CPT | Performed by: INTERNAL MEDICINE

## 2024-12-09 PROCEDURE — 3051F HG A1C>EQUAL 7.0%<8.0%: CPT | Performed by: INTERNAL MEDICINE

## 2024-12-09 PROCEDURE — 1036F TOBACCO NON-USER: CPT | Performed by: INTERNAL MEDICINE

## 2024-12-09 RX ORDER — DIPHENHYDRAMINE HYDROCHLORIDE 50 MG/ML
50 INJECTION INTRAMUSCULAR; INTRAVENOUS AS NEEDED
OUTPATIENT
Start: 2024-12-16

## 2024-12-09 RX ORDER — FAMOTIDINE 10 MG/ML
20 INJECTION INTRAVENOUS ONCE AS NEEDED
OUTPATIENT
Start: 2024-12-16

## 2024-12-09 RX ORDER — HEPARIN SODIUM,PORCINE/PF 10 UNIT/ML
50 SYRINGE (ML) INTRAVENOUS AS NEEDED
OUTPATIENT
Start: 2024-12-09

## 2024-12-09 RX ORDER — HEPARIN 100 UNIT/ML
500 SYRINGE INTRAVENOUS AS NEEDED
OUTPATIENT
Start: 2024-12-09

## 2024-12-09 RX ORDER — EPINEPHRINE 0.3 MG/.3ML
0.3 INJECTION SUBCUTANEOUS EVERY 5 MIN PRN
OUTPATIENT
Start: 2024-12-16

## 2024-12-09 RX ORDER — ALBUTEROL SULFATE 0.83 MG/ML
3 SOLUTION RESPIRATORY (INHALATION) AS NEEDED
OUTPATIENT
Start: 2024-12-16

## 2024-12-09 ASSESSMENT — PAIN SCALES - GENERAL: PAINLEVEL_OUTOF10: 0-NO PAIN

## 2024-12-09 NOTE — PROGRESS NOTES
Patient ID: Corinne Elizondo is a 49 y.o. female.  Referring Physician: No referring provider defined for this encounter.  Primary Care Provider: Kingston Beckett MD  Referral Reason: hemochromatosis    Subjective:  Arhtralgia and weight gain , no other complaints    Heme/Onc History:  Her mother who is a patient of mine who has hemochromatosis        Past Medical History:   Past Medical History:   Diagnosis Date    Overweight     little     Social History:   Social History     Socioeconomic History    Marital status:      Spouse name: Not on file    Number of children: 3    Years of education: Not on file    Highest education level: Not on file   Occupational History     Comment: She works for ______.   Tobacco Use    Smoking status: Never     Passive exposure: Past    Smokeless tobacco: Never   Vaping Use    Vaping status: Never Used   Substance and Sexual Activity    Alcohol use: Yes     Comment: about once a month    Drug use: Not on file    Sexual activity: Not on file   Other Topics Concern    Not on file   Social History Narrative    Not on file     Social Drivers of Health     Financial Resource Strain: Patient Declined (4/6/2023)    Received from [facility]    Overall Financial Resource Strain (CARDIA)     Difficulty of Paying Living Expenses: Patient declined   Food Insecurity: Patient Declined (4/6/2023)    Received from [facility]    Hunger Vital Sign     Worried About Running Out of Food in the Last Year: Patient declined     Ran Out of Food in the Last Year: Patient declined   Transportation Needs: No Transportation Needs (4/6/2023)    Received from [facility]    PRAPARE - Transportation     Lack of Transportation (Medical): No     Lack of Transportation (Non-Medical): No   Physical Activity: Insufficiently Active (4/6/2023)    Received from [facility]    Exercise Vital Sign     Days of Exercise per  Week: 3 days     Minutes of Exercise per Session: 20 min   Stress: Stress Concern Present (4/6/2023)    Received from [facility]    [place] [employer] - Occupational Stress Questionnaire     Feeling of Stress : To some extent   Social Connections: Moderately Isolated (4/6/2023)    Received from [facility]    Social Connection and Isolation Panel [NHANES]     Frequency of Communication with Friends and Family: Twice a week     Frequency of Social Gatherings with Friends and Family: Once a week     Attends [employer] Services: Never     Active Member of Clubs or Organizations: No     Attends Club or Organization Meetings: Patient declined     Marital Status:    Intimate Partner Violence: Not on file   Housing Stability: Unknown (4/6/2023)    Received from [facility]    Housing Stability Vital Sign     Unable to Pay for Housing in the Last Year: Patient refused     Number of Places Lived in the Last Year: Not on file     Unstable Housing in the Last Year: No     Surgical History:   Past Surgical History:   Procedure Laterality Date    FETAL SURGERY FOR CONGENITAL HERNIA       Family History:   Family History   Problem Relation Name Age of Onset    Other (heterozygous hemochromatosis.) Mother        reports that she has never smoked. She has been exposed to tobacco smoke. She has never used smokeless tobacco.  Oncology Family history: Cancer-related family history is not on file.    Review Of Systems:  As stated per in HPI; otherwise all other 12 point ROS are negative    Physical Exam:  There were no vitals taken for this visit.  BSA: There is no height or weight on file to calculate BSA.  General: awake/alert/oriented x3, no distress, alert and cooperative  Head: Short hair fully covering scalp. Symmetric facial expressions  Eyes: PERRL, EOMI, clear sclera, eyebrows present.  Ears/Nose/Mouth/Throat:  Oral mucous  membranes moist. No oral ulcers. No palpable pre/post-auricular lymph nodes  Neck: No palpable cervical chain lymph nodes  Respiratory: unlabored breathing on room air, good chest expansion, thorax symmetric  Cardio: Regular rate and rhythm, normal S1 and S2, radial pulses symmetric  GI: Nondistended, soft, non-tender abdomen  Musculoskeletal: Normal muscle bulk and tone, ROM intact, no joint swelling.  Rises from chair and walks unassisted.  Extremities: No ankle swelling, no arm or leg wounds  Neuro: Alert, cognition intact, speech normal. Facial expressions symmetric.  No motor deficits noted. Sensation intact to touch and hot/cold.   Able to stand from seated position unassisted and walks around the room unassisted.  Psychological: Appropriate mood and behavior.  Skin: Warm and dry, no lesions, no rashes    Results:  Diagnostic Results   Lab Results   Component Value Date    WBC 8.9 12/06/2024    HGB 14.1 12/06/2024    HCT 40.5 12/06/2024    MCV 92 12/06/2024     12/06/2024     Lab Results   Component Value Date    CALCIUM 9.6 12/06/2024     12/06/2024    K 3.9 12/06/2024    CO2 26 12/06/2024     12/06/2024    BUN 12 12/06/2024    CREATININE 0.76 12/06/2024    ALT 35 12/06/2024    AST 39 12/06/2024       Current Outpatient Medications:     amoxicillin-pot clavulanate (Augmentin) 875-125 mg tablet, Take 1 tablet (875 mg) by mouth 2 times a day for 7 days., Disp: 14 tablet, Rfl: 0    Blood glucose monitoring meter kit kit, Test twice daily, Disp: 1 each, Rfl: 0    FreeStyle Opal 3 [place] misc, Use as instructed, Disp: 1 each, Rfl: 0    FreeStyle Opal 3 Sensor device, Test twice daily, Disp: 5 each, Rfl: 0    FreeStyle Test strip, Test twice daily, Disp: 200 each, Rfl: 3    lancets misc, Test twice daily, Disp: 200 each, Rfl: 3    semaglutide (Rybelsus) 3 mg tablet, Take 1 tablet (3 mg) by mouth once daily., Disp: 30 tablet, Rfl: 0    semaglutide (Rybelsus) 3 mg tablet, Take 1 tablet (3 mg) by  mouth once daily., Disp: 30 tablet, Rfl: 0    semaglutide 0.25 mg or 0.5 mg (2 mg/3 mL) pen injector, Inject 0.5 mg under the skin 1 (one) time per week., Disp: 3 mL, Rfl: 0     Radiology:    Pathology:    Assessment/Plan:  ? Hemochroamtosis:    MRI liver and echo does not show iron overload.    Iron panel shows ferritin above 500. Iron sat is %29. I will order 1 phlebotomy and she has to avoid red meat. Currently, she eats meat 1-2 times per week    2- Fatty liver: liver is 22 cm. Severe steatohepatosis. Weight loss is encouraged. Will talk to her PCP in January about weight loss inj. Decline hepatology referral politely.    3- Pancreas cyst: 5 mm. Likely benign. She is concerned about IPMN. Will check MRI in 1 year (12/25). Her mother's half brother has pancreas cancer but no other FHX of cancers.    RTC in 6 months with labs    Diagnoses and all orders for this visit:  Hemochromatosis, unspecified hemochromatosis type  -     Clinic Appointment Request Virtual Est  Hereditary hemochromatosis (CMS-HCC)  -     Clinic Appointment Request Virtual Est  Type 2 diabetes mellitus without complication, without long-term current use of insulin (Multi)  -     Clinic Appointment Request Virtual Est       Performance Status: Asymptomatic    I spent more than 60 minutes for the patient today, including face-to-face conversation, pre-visit preparation, post-visit orders, and others.   Pepper Chacon MD

## 2024-12-17 ENCOUNTER — INFUSION (OUTPATIENT)
Dept: HEMATOLOGY/ONCOLOGY | Facility: CLINIC | Age: 49
End: 2024-12-17
Payer: COMMERCIAL

## 2024-12-17 VITALS
RESPIRATION RATE: 16 BRPM | BODY MASS INDEX: 37.59 KG/M2 | TEMPERATURE: 97.2 F | DIASTOLIC BLOOD PRESSURE: 88 MMHG | SYSTOLIC BLOOD PRESSURE: 143 MMHG | OXYGEN SATURATION: 100 % | WEIGHT: 206.35 LBS | HEART RATE: 88 BPM

## 2024-12-17 DIAGNOSIS — E83.119 HEMOCHROMATOSIS, UNSPECIFIED HEMOCHROMATOSIS TYPE: ICD-10-CM

## 2024-12-17 PROCEDURE — 99195 PHLEBOTOMY: CPT

## 2024-12-17 RX ORDER — EPINEPHRINE 0.3 MG/.3ML
0.3 INJECTION SUBCUTANEOUS EVERY 5 MIN PRN
OUTPATIENT
Start: 2024-12-17

## 2024-12-17 RX ORDER — DIPHENHYDRAMINE HYDROCHLORIDE 50 MG/ML
50 INJECTION INTRAMUSCULAR; INTRAVENOUS AS NEEDED
OUTPATIENT
Start: 2024-12-17

## 2024-12-17 RX ORDER — ALBUTEROL SULFATE 0.83 MG/ML
3 SOLUTION RESPIRATORY (INHALATION) AS NEEDED
OUTPATIENT
Start: 2024-12-17

## 2024-12-17 RX ORDER — FAMOTIDINE 10 MG/ML
20 INJECTION INTRAVENOUS ONCE AS NEEDED
OUTPATIENT
Start: 2024-12-17

## 2025-01-08 ENCOUNTER — APPOINTMENT (OUTPATIENT)
Dept: PRIMARY CARE | Facility: CLINIC | Age: 50
End: 2025-01-08
Payer: COMMERCIAL

## 2025-01-08 VITALS
SYSTOLIC BLOOD PRESSURE: 146 MMHG | BODY MASS INDEX: 37.73 KG/M2 | WEIGHT: 205 LBS | DIASTOLIC BLOOD PRESSURE: 88 MMHG | HEIGHT: 62 IN

## 2025-01-08 DIAGNOSIS — E83.119 HEMOCHROMATOSIS, UNSPECIFIED HEMOCHROMATOSIS TYPE: Primary | ICD-10-CM

## 2025-01-08 DIAGNOSIS — Z13.220 LIPID SCREENING: ICD-10-CM

## 2025-01-08 DIAGNOSIS — R10.84 GENERALIZED ABDOMINAL PAIN: ICD-10-CM

## 2025-01-08 DIAGNOSIS — R21 RASH: ICD-10-CM

## 2025-01-08 DIAGNOSIS — R53.83 OTHER FATIGUE: ICD-10-CM

## 2025-01-08 DIAGNOSIS — K76.0 FATTY LIVER: ICD-10-CM

## 2025-01-08 DIAGNOSIS — E11.9 TYPE 2 DIABETES MELLITUS WITHOUT COMPLICATION, WITHOUT LONG-TERM CURRENT USE OF INSULIN (MULTI): ICD-10-CM

## 2025-01-08 DIAGNOSIS — E66.3 OVERWEIGHT: ICD-10-CM

## 2025-01-08 PROCEDURE — 3008F BODY MASS INDEX DOCD: CPT | Performed by: INTERNAL MEDICINE

## 2025-01-08 PROCEDURE — 3077F SYST BP >= 140 MM HG: CPT | Performed by: INTERNAL MEDICINE

## 2025-01-08 PROCEDURE — 3079F DIAST BP 80-89 MM HG: CPT | Performed by: INTERNAL MEDICINE

## 2025-01-08 PROCEDURE — 99214 OFFICE O/P EST MOD 30 MIN: CPT | Performed by: INTERNAL MEDICINE

## 2025-01-08 RX ORDER — LIDOCAINE 50 MG/G
OINTMENT TOPICAL AS NEEDED
Qty: 50 G | Refills: 0 | Status: SHIPPED | OUTPATIENT
Start: 2025-01-08 | End: 2026-01-08

## 2025-01-09 NOTE — PROGRESS NOTES
"Subjective   Patient ID: Corinne Elizondo is a 49 y.o. female who presents for Medication Problem and Follow-up.    Ms. Elizondo today came here for follow-up on various conditions.  Overall, she is a happy person.  Appetite and weight are okay.  No chest pain.  No shortness of breath.  Taking medications regularly.  No side effects.  She came for follow-up.    I have personally reviewed the patient's Past Medical History, Medications, Allergies, Social History, and Family History in the EMR.    Review of Systems   All other systems reviewed and are negative.    Objective   /88   Ht 1.575 m (5' 2\")   Wt 93 kg (205 lb)   BMI 37.49 kg/m²     Physical Exam  Vitals reviewed.   Cardiovascular:      Heart sounds: Normal heart sounds, S1 normal and S2 normal. No murmur heard.     No friction rub.   Pulmonary:      Effort: Pulmonary effort is normal.      Breath sounds: Normal breath sounds and air entry.   Abdominal:      Palpations: There is no hepatomegaly, splenomegaly or mass.   Musculoskeletal:      Right lower leg: No edema.      Left lower leg: No edema.   Lymphadenopathy:      Lower Body: No right inguinal adenopathy. No left inguinal adenopathy.   Neurological:      Cranial Nerves: Cranial nerves 2-12 are intact.      Sensory: No sensory deficit.      Motor: Motor function is intact.      Deep Tendon Reflexes: Reflexes are normal and symmetric.     LAB WORK: Laboratory testing discussed.    Assessment/Plan   Problem List Items Addressed This Visit             ICD-10-CM       Gastrointestinal and Abdominal    Hemochromatosis - Primary E83.119     Other Visit Diagnoses         Codes    Type 2 diabetes mellitus without complication, without long-term current use of insulin (Multi)     E11.9    Relevant Orders    Hemoglobin A1C    Lipid screening     Z13.220    Relevant Orders    Comprehensive Metabolic Panel    Other fatigue     R53.83    Relevant Orders    CBC    Ammonia    Generalized abdominal pain     " R10.84    Relevant Orders    Lipase    Amylase    Rash     R21    Relevant Medications    lidocaine (Xylocaine) 5 % ointment    Fatty liver     K76.0    Overweight     E66.3        1. Type 2 diabetes.  The patient is a good candidate to start the Ozempic low-dose.  Correct method explained.  Take on thigh, dial it correctly, explained.  2. Hemochromatosis.  3. Fatty liver, get better.  I will monitor.  4. Overweight.  The patient tried everything.  She spoke to Dr. Pabon, hematologist.  5. I will see her in four weeks.  6. I reassured her nothing to be concerned about.  Continue to make her efforts to lose weight.  8. For blood test, Emla cream given.  9. The patient told me that she wants Xylocaine gel for numbing.  That is fine with me.  10. Continuous sugar monitor.  Dexcom is preferred, given.    Scribe Attestation  By signing my name below, IZoraida Scribe attest that this documentation has been prepared under the direction and in the presence of Kingston Beckett MD.     All medical record entries made by the scribe were personally dictated by me I have reviewed the chart and agree the record accurately reflects my personal performance of his history physical examination and management

## 2025-01-14 ENCOUNTER — TELEMEDICINE (OUTPATIENT)
Dept: PRIMARY CARE | Facility: CLINIC | Age: 50
End: 2025-01-14
Payer: COMMERCIAL

## 2025-01-14 DIAGNOSIS — R35.0 URINARY FREQUENCY: Primary | ICD-10-CM

## 2025-01-14 PROCEDURE — 99214 OFFICE O/P EST MOD 30 MIN: CPT | Performed by: NURSE PRACTITIONER

## 2025-01-14 NOTE — PATIENT INSTRUCTIONS
Submit sample  Orders have been placed for you, please call in advance to ensure proper supplies    Treatment will be sent pending results    Increase fluids, Tylenol for pain or fever

## 2025-01-14 NOTE — PROGRESS NOTES
Subjective   Patient ID: Corinne Elizondo is a 49 y.o. female who presents for a Virtual Visit UTI.    UTI   This is a new problem. The current episode started yesterday. The problem occurs every urination. The problem has been gradually worsening. There has been no fever. She is Sexually active. There is A history of pyelonephritis. Associated symptoms comments: Endorses urinary frequency, bladder pressure, tingling sensation after urination.   Denies f/c/n/v, flank pain, pregnancy, discharge, odor, history of UTI since a teen    Recently diagnosed with hemochromatosis, DM2, NAFLD  . She has tried nothing for the symptoms. Her past medical history is significant for recurrent UTIs.       Review of Systems    Physical Exam not performed  E-visit questionnaire reviewed and discussed with patient.   All questions answered    Assessment/Plan   Problem List Items Addressed This Visit             ICD-10-CM    Urinary frequency - Primary R35.0    Relevant Orders    Urinalysis with Reflex Culture and Microscopic     Discussed with patient   Verbalized understanding, Teach back utilized  Recommend ED or UC if worsening symptoms, pain, fever

## 2025-03-07 ENCOUNTER — TELEMEDICINE (OUTPATIENT)
Dept: PRIMARY CARE | Facility: CLINIC | Age: 50
End: 2025-03-07
Payer: COMMERCIAL

## 2025-03-07 DIAGNOSIS — J06.9 VIRAL URI WITH COUGH: Primary | ICD-10-CM

## 2025-03-07 PROCEDURE — 99213 OFFICE O/P EST LOW 20 MIN: CPT | Performed by: NURSE PRACTITIONER

## 2025-03-07 RX ORDER — BENZONATATE 200 MG/1
200 CAPSULE ORAL 3 TIMES DAILY PRN
Qty: 42 CAPSULE | Refills: 0 | Status: SHIPPED | OUTPATIENT
Start: 2025-03-07 | End: 2025-04-06

## 2025-03-07 RX ORDER — METHYLPREDNISOLONE 4 MG/1
TABLET ORAL
Qty: 21 TABLET | Refills: 0 | Status: SHIPPED | OUTPATIENT
Start: 2025-03-07 | End: 2025-03-13

## 2025-03-07 ASSESSMENT — ENCOUNTER SYMPTOMS
NAUSEA: 0
DIAPHORESIS: 0
FEVER: 0
LIGHT-HEADEDNESS: 0
SHORTNESS OF BREATH: 0
VOMITING: 0
APPETITE CHANGE: 0
COUGH: 1
MYALGIAS: 1
ACTIVITY CHANGE: 0
HEADACHES: 0
WHEEZING: 0
CHEST TIGHTNESS: 1
DIARRHEA: 0
DIZZINESS: 0
CHILLS: 0
FATIGUE: 1
FLU SYMPTOMS: 1
BACK PAIN: 0

## 2025-03-07 NOTE — PROGRESS NOTES
Subjective   Patient ID: Corinne Elizondo is a 50 y.o. female who presents for Flu Symptoms (Sx onset: 1 week).    Sx onset: 1 week ago  Sx include: cough (dry), body aches  Denies sore throat, HA, congestion  Temp 100.1    States she was ill around 2/18/2025, symptoms improved however cough has remained, feeling tired with body aches, low grade ever       Flu Symptoms  Associated symptoms include coughing, fatigue and myalgias. Pertinent negatives include no chills, diaphoresis, fever, headaches, nausea or vomiting.        Review of Systems   Constitutional:  Positive for fatigue. Negative for activity change, appetite change, chills, diaphoresis and fever.   HENT: Negative.     Respiratory:  Positive for cough and chest tightness. Negative for shortness of breath and wheezing.    Gastrointestinal:  Negative for diarrhea, nausea and vomiting.   Musculoskeletal:  Positive for myalgias. Negative for back pain.   Neurological:  Negative for dizziness, light-headedness and headaches.       Objective   There were no vitals taken for this visit.    Physical Exam  Constitutional:       General: She is not in acute distress.     Appearance: Normal appearance. She is obese. She is not ill-appearing.      Comments: On Demand Virtual Visit Patient Consent     An interactive audio and video telecommunication system which permits real time communications between the patient (at the originating site) and provider (at the distant site) was utilized to provide this telehealth service.   Verbal consent was requested and obtained from Corinne Elizondo (or parent if under 18) on this date, for a telehealth visit.   I have verbally confirmed with Corinne Elizondo (or parent if under 18) that they are physically located in the [facility] during this virtual visit.    I performed this visit using realtime telehealth tools, including an audio/video OR telephone connection between the patient listed who was located in the [facility]  and myself, Flip Tafoya CNP (licensed in the [facility]).  At the start of the visit, I introduced myself as Flip Tafoya, Nurse practitioner and verified the patients name, , and current physical location.    If they were currently outside of the state of [place], the visit was ended and the patient was referred to alternative means for evaluation and treatment.   The patient was made aware of the limitations of the telehealth visit.  They will not be physically examined and all issues may not be appropriate for a telehealth visit.  If necessary, an in person referral will be made.       DISCLAIMER:   In preparing for this visit and writing this note, I reviewed previous electronic medical records (labs, imaging and medical charts) available.  Significant findings which helped in decision making are recorded in this encounter charting.     Pulmonary:      Effort: Pulmonary effort is normal.      Comments: Cough induced with deep breath, dry cough, otherwise able to speak in full sentences without difficulty   Neurological:      Mental Status: She is alert and oriented to person, place, and time.         Assessment/Plan   Diagnoses and all orders for this visit:  Viral URI with cough  -     methylPREDNISolone (Medrol Dospak) 4 mg tablets; Take as directed on package.  Steroid can increase glucose levels, monitor glucose daily while on steroid (DMT2)  -     benzonatate (Tessalon) 200 mg capsule; Take 1 capsule (200 mg) by mouth 3 times a day as needed for cough. Do not crush or chew.      recommend warm liquids for sore throat, honey 1tsp three times daily may help cough, and Tylenol as needed for pain/fever.  Cool mist humidifier, vapo rubs may also help with congestion   Rest and drink plenty of fluids    Most upper respiratory infection are caused by viruses but sometimes they cause secondary bacterial infections. It can cause cough, congestion, runny nose, sore throat, and fever. You are contagious. Fever  medicines can help reduce fever and pain. Virus cannot be cured by an antibiotic. The body's immune system will fight off the virus. Upper Respiratory Illnesses usually improves in 7 - 10 days, but coughs can last for several weeks. If your symptoms worsen after 10 - 14 days you may have a bacterial infection.   Follow up with PCP as needed  Education [facility]

## 2025-03-07 NOTE — PATIENT INSTRUCTIONS
Pleasure meeting with you today!    Let me know if you need anything.     Please send me a MyChart message if you have any questions or concerns.  FOR NON URGENT questions only.  Allow up to 72 hours for response.     If you have prescription issues or other questions you can email   Kemi Majano,  Digital Health Coordinator, at   [email or web address]

## 2025-04-22 DIAGNOSIS — E11.9 TYPE 2 DIABETES MELLITUS WITHOUT COMPLICATION, WITHOUT LONG-TERM CURRENT USE OF INSULIN: ICD-10-CM

## 2025-04-22 RX ORDER — BLOOD-GLUCOSE SENSOR
EACH MISCELLANEOUS
Qty: 2 EACH | Refills: 5 | Status: SHIPPED | OUTPATIENT
Start: 2025-04-22

## 2025-06-03 ENCOUNTER — LAB (OUTPATIENT)
Dept: LAB | Facility: CLINIC | Age: 50
End: 2025-06-03
Payer: COMMERCIAL

## 2025-06-03 DIAGNOSIS — Z13.220 LIPID SCREENING: ICD-10-CM

## 2025-06-03 DIAGNOSIS — R53.83 OTHER FATIGUE: ICD-10-CM

## 2025-06-03 DIAGNOSIS — R10.84 GENERALIZED ABDOMINAL PAIN: ICD-10-CM

## 2025-06-03 DIAGNOSIS — E11.9 TYPE 2 DIABETES MELLITUS WITHOUT COMPLICATION, WITHOUT LONG-TERM CURRENT USE OF INSULIN: ICD-10-CM

## 2025-06-03 LAB
ALBUMIN SERPL BCP-MCNC: 4.7 G/DL (ref 3.4–5)
ALP SERPL-CCNC: 70 U/L (ref 33–110)
ALT SERPL W P-5'-P-CCNC: 38 U/L (ref 7–45)
AMMONIA PLAS-SCNC: 37 UMOL/L (ref 16–53)
AMYLASE SERPL-CCNC: 45 U/L (ref 29–103)
ANION GAP SERPL CALC-SCNC: 17 MMOL/L (ref 10–20)
AST SERPL W P-5'-P-CCNC: 37 U/L (ref 9–39)
BASOPHILS # BLD AUTO: 0.04 X10*3/UL (ref 0–0.1)
BASOPHILS NFR BLD AUTO: 0.4 %
BILIRUB SERPL-MCNC: 0.6 MG/DL (ref 0–1.2)
BUN SERPL-MCNC: 11 MG/DL (ref 6–23)
CALCIUM SERPL-MCNC: 9.6 MG/DL (ref 8.6–10.3)
CHLORIDE SERPL-SCNC: 101 MMOL/L (ref 98–107)
CO2 SERPL-SCNC: 24 MMOL/L (ref 21–32)
CREAT SERPL-MCNC: 0.69 MG/DL (ref 0.5–1.05)
EGFRCR SERPLBLD CKD-EPI 2021: >90 ML/MIN/1.73M*2
EOSINOPHIL # BLD AUTO: 0.17 X10*3/UL (ref 0–0.7)
EOSINOPHIL NFR BLD AUTO: 1.9 %
ERYTHROCYTE [DISTWIDTH] IN BLOOD BY AUTOMATED COUNT: 12.6 % (ref 11.5–14.5)
EST. AVERAGE GLUCOSE BLD GHB EST-MCNC: 166 MG/DL
FERRITIN SERPL-MCNC: 296 NG/ML (ref 8–150)
GLUCOSE SERPL-MCNC: 121 MG/DL (ref 74–99)
HBA1C MFR BLD: 7.4 % (ref ?–5.7)
HCT VFR BLD AUTO: 40.9 % (ref 36–46)
HGB BLD-MCNC: 14.2 G/DL (ref 12–16)
IMM GRANULOCYTES # BLD AUTO: 0.03 X10*3/UL (ref 0–0.7)
IMM GRANULOCYTES NFR BLD AUTO: 0.3 % (ref 0–0.9)
IRON SATN MFR SERPL: 34 % (ref 25–45)
IRON SERPL-MCNC: 147 UG/DL (ref 35–150)
LIPASE SERPL-CCNC: 63 U/L (ref 9–82)
LYMPHOCYTES # BLD AUTO: 3.13 X10*3/UL (ref 1.2–4.8)
LYMPHOCYTES NFR BLD AUTO: 34.6 %
MCH RBC QN AUTO: 31.5 PG (ref 26–34)
MCHC RBC AUTO-ENTMCNC: 34.7 G/DL (ref 32–36)
MCV RBC AUTO: 91 FL (ref 80–100)
MONOCYTES # BLD AUTO: 0.8 X10*3/UL (ref 0.1–1)
MONOCYTES NFR BLD AUTO: 8.8 %
NEUTROPHILS # BLD AUTO: 4.87 X10*3/UL (ref 1.2–7.7)
NEUTROPHILS NFR BLD AUTO: 54 %
NRBC BLD-RTO: 0 /100 WBCS (ref 0–0)
PLATELET # BLD AUTO: 335 X10*3/UL (ref 150–450)
POTASSIUM SERPL-SCNC: 4.1 MMOL/L (ref 3.5–5.3)
PROT SERPL-MCNC: 7.6 G/DL (ref 6.4–8.2)
RBC # BLD AUTO: 4.51 X10*6/UL (ref 4–5.2)
SODIUM SERPL-SCNC: 138 MMOL/L (ref 136–145)
TIBC SERPL-MCNC: 427 UG/DL (ref 240–445)
UIBC SERPL-MCNC: 280 UG/DL (ref 110–370)
VIT B12 SERPL-MCNC: 392 PG/ML (ref 211–911)
WBC # BLD AUTO: 9 X10*3/UL (ref 4.4–11.3)

## 2025-06-03 PROCEDURE — 82728 ASSAY OF FERRITIN: CPT | Performed by: INTERNAL MEDICINE

## 2025-06-03 PROCEDURE — 83540 ASSAY OF IRON: CPT | Performed by: INTERNAL MEDICINE

## 2025-06-03 PROCEDURE — 83690 ASSAY OF LIPASE: CPT

## 2025-06-03 PROCEDURE — 36415 COLL VENOUS BLD VENIPUNCTURE: CPT

## 2025-06-03 PROCEDURE — 82140 ASSAY OF AMMONIA: CPT

## 2025-06-03 PROCEDURE — 82150 ASSAY OF AMYLASE: CPT

## 2025-06-03 PROCEDURE — 85025 COMPLETE CBC W/AUTO DIFF WBC: CPT | Performed by: INTERNAL MEDICINE

## 2025-06-03 PROCEDURE — 80053 COMPREHEN METABOLIC PANEL: CPT | Performed by: INTERNAL MEDICINE

## 2025-06-03 PROCEDURE — 82607 VITAMIN B-12: CPT | Performed by: INTERNAL MEDICINE

## 2025-06-03 PROCEDURE — 83036 HEMOGLOBIN GLYCOSYLATED A1C: CPT

## 2025-06-05 ASSESSMENT — PROMIS GLOBAL HEALTH SCALE
CARRYOUT_PHYSICAL_ACTIVITIES: COMPLETELY
RATE_AVERAGE_FATIGUE: MODERATE
RATE_QUALITY_OF_LIFE: GOOD
RATE_SOCIAL_SATISFACTION: VERY GOOD
RATE_GENERAL_HEALTH: GOOD
RATE_MENTAL_HEALTH: VERY GOOD
CARRYOUT_SOCIAL_ACTIVITIES: EXCELLENT
EMOTIONAL_PROBLEMS: SOMETIMES
RATE_AVERAGE_PAIN: 5
RATE_PHYSICAL_HEALTH: GOOD

## 2025-06-06 ENCOUNTER — OFFICE VISIT (OUTPATIENT)
Dept: PRIMARY CARE | Facility: CLINIC | Age: 50
End: 2025-06-06
Payer: COMMERCIAL

## 2025-06-06 ENCOUNTER — TELEMEDICINE (OUTPATIENT)
Dept: HEMATOLOGY/ONCOLOGY | Facility: CLINIC | Age: 50
End: 2025-06-06
Payer: COMMERCIAL

## 2025-06-06 VITALS
WEIGHT: 201.6 LBS | SYSTOLIC BLOOD PRESSURE: 138 MMHG | HEIGHT: 62 IN | BODY MASS INDEX: 37.1 KG/M2 | DIASTOLIC BLOOD PRESSURE: 82 MMHG

## 2025-06-06 DIAGNOSIS — E11.9 TYPE 2 DIABETES MELLITUS WITHOUT COMPLICATION, WITHOUT LONG-TERM CURRENT USE OF INSULIN: Primary | ICD-10-CM

## 2025-06-06 DIAGNOSIS — E83.119 HEMOCHROMATOSIS, UNSPECIFIED HEMOCHROMATOSIS TYPE: ICD-10-CM

## 2025-06-06 DIAGNOSIS — E83.110 HEREDITARY HEMOCHROMATOSIS: ICD-10-CM

## 2025-06-06 DIAGNOSIS — E11.9 TYPE 2 DIABETES MELLITUS WITHOUT COMPLICATION, WITHOUT LONG-TERM CURRENT USE OF INSULIN: ICD-10-CM

## 2025-06-06 PROCEDURE — 3075F SYST BP GE 130 - 139MM HG: CPT | Performed by: INTERNAL MEDICINE

## 2025-06-06 PROCEDURE — 3079F DIAST BP 80-89 MM HG: CPT | Performed by: INTERNAL MEDICINE

## 2025-06-06 PROCEDURE — 99214 OFFICE O/P EST MOD 30 MIN: CPT | Performed by: INTERNAL MEDICINE

## 2025-06-06 PROCEDURE — 3051F HG A1C>EQUAL 7.0%<8.0%: CPT | Performed by: INTERNAL MEDICINE

## 2025-06-06 PROCEDURE — 3008F BODY MASS INDEX DOCD: CPT | Performed by: INTERNAL MEDICINE

## 2025-06-06 PROCEDURE — 99215 OFFICE O/P EST HI 40 MIN: CPT | Performed by: INTERNAL MEDICINE

## 2025-06-06 NOTE — PROGRESS NOTES
Patient ID: Corinne Elizondo is a 50 y.o. female.  Referring Physician: Pepper Chacon MD  [id number]  [facility] [place] [address]  Primary Care Provider: Kingston Beckett MD  Referral Reason: hemochromatosis    Subjective:  Arhtralgia and weight gain , no other complaints    Heme/Onc History:  Her mother who is a patient of mine who has hemochromatosis        Past Medical History:   Past Medical History:   Diagnosis Date    Overweight     little     Social History:   Social History     Socioeconomic History    Marital status:      Spouse name: Not on file    Number of children: 3    Years of education: Not on file    Highest education level: Not on file   Occupational History     Comment: She works for ______.   Tobacco Use    Smoking status: Never     Passive exposure: Past    Smokeless tobacco: Never   Vaping Use    Vaping status: Never Used   Substance and Sexual Activity    Alcohol use: Not Currently     Comment: about once a month    Drug use: Not on file    Sexual activity: Not on file   Other Topics Concern    Not on file   Social History Narrative    Not on file     Social Drivers of Health     Financial Resource Strain: Patient Declined (4/6/2023)    Received from [facility]    Overall Financial Resource Strain (CARDIA)     Difficulty of Paying Living Expenses: Patient declined   Food Insecurity: Patient Declined (4/6/2023)    Received from [facility]    Hunger Vital Sign     Worried About Running Out of Food in the Last Year: Patient declined     Ran Out of Food in the Last Year: Patient declined   Transportation Needs: No Transportation Needs (4/6/2023)    Received from [facility]    PRAPARE - Transportation     Lack of Transportation (Medical): No     Lack of Transportation (Non-Medical): No   Physical Activity: Insufficiently Active (4/6/2023)    Received from [facility]    Exercise Vital Sign     Days of Exercise per Week: 3 days     Minutes of Exercise per Session:  20 min   Stress: Stress Concern Present (4/6/2023)    Received from [facility]    [place] of [employer]ational [employer] - Occupational Stress Questionnaire     Feeling of Stress : To some extent   Social Connections: Moderately Isolated (4/6/2023)    Received from [facility]    Social Connection and Isolation Panel [NHANES]     Frequency of Communication with Friends and Family: Twice a week     Frequency of Social Gatherings with Friends and Family: Once a week     Attends [employer] Services: Never     Active Member of Clubs or Organizations: No     Attends Club or Organization Meetings: Patient declined     Marital Status:    Intimate Partner Violence: Not on file   Housing Stability: Unknown (4/6/2023)    Received from [facility]    Housing Stability Vital Sign     Unable to Pay for Housing in the Last Year: Patient refused     Number of Places Lived in the Last Year: Not on file     Unstable Housing in the Last Year: No     Surgical History:   Past Surgical History:   Procedure Laterality Date    FETAL SURGERY FOR CONGENITAL HERNIA       Family History:   Family History   Problem Relation Name Age of Onset    Other (heterozygous hemochromatosis.) Mother        reports that she has never smoked. She has been exposed to tobacco smoke. She has never used smokeless tobacco.  Oncology Family history: Cancer-related family history is not on file.    Review Of Systems:  As stated per in HPI; otherwise all other 12 point ROS are negative    Physical Exam:  There were no vitals taken for this visit.  BSA: There is no height or weight on file to calculate BSA.      Results:  Diagnostic Results   Lab Results   Component Value Date    WBC 9.0 06/03/2025    HGB 14.2 06/03/2025    HCT 40.9 06/03/2025    MCV 91 06/03/2025     06/03/2025     Lab Results   Component Value Date    CALCIUM 9.6 06/03/2025     06/03/2025    K 4.1 06/03/2025    CO2 24 06/03/2025     06/03/2025    BUN 11  06/03/2025    CREATININE 0.69 06/03/2025    ALT 38 06/03/2025    AST 37 06/03/2025       Current Outpatient Medications:     Blood glucose monitoring meter kit kit, Test twice daily, Disp: 1 each, Rfl: 0    Dexcom G7 Sensor device, Use as directed, Disp: 2 each, Rfl: 5    FreeStyle Test strip, Test twice daily, Disp: 200 each, Rfl: 3    lidocaine (Xylocaine) 5 % ointment, apply topically if needed for mild pain (1-3), Disp: 50 g, Rfl: 0    Ozempic 0.25 mg or 0.5 mg (2 mg/3 mL) pen injector, inject 0.5mg under the skin once weekly, Disp: 3 mL, Rfl: 0       Assessment/Plan:  ? Hemochromatosis:    MRI liver and echo does not show iron overload.    06/25: no phlebotomy needed    2- Fatty liver: liver is 22 cm. Severe steatohepatosis. Weight loss is encouraged. Will talk to her PCP in January about weight loss inj. Decline hepatology referral politely.    3- Pancreas cyst: 5 mm. Likely benign. She is concerned about IPMN. Will check MRI in 1 year (12/25). Her mother's half brother has pancreas cancer but no other FHX of cancers.    RTC in 6 months with labs and MRI pancreas    Diagnoses and all orders for this visit:  Hemochromatosis, unspecified hemochromatosis type  -     Clinic Appointment Request  Hereditary hemochromatosis  -     Clinic Appointment Request  Type 2 diabetes mellitus without complication, without long-term current use of insulin  -     Clinic Appointment Request       Performance Status: Asymptomatic    I spent more than 60 minutes for the patient today, including face-to-face conversation, pre-visit preparation, post-visit orders, and others.   Pepper Chacon MD

## 2025-06-07 NOTE — PROGRESS NOTES
"Subjective   Patient ID: Corinne Elizondo is a 50 y.o. female who presents for Follow-up (Various conditions).    1. Ms. Corinne Elizondo is very suspected and very unhappy.  They will not approve her continuous sugar monitoring chip.  She likes G-7 and they are not covering.  She cannot afford the 700 dollars a month.  2. She came here for follow-up.  She thinks that Ozempic should be increased for her.  3. Follow-up on various conditions.  Appetite and weight are okay.  No problem.    I have personally reviewed the patient's Past Medical History, Medications, Allergies, Social History, and Family History in the EMR.    Review of Systems   All other systems reviewed and are negative.    Objective   /82   Ht 1.575 m (5' 2\")   Wt 91.4 kg (201 lb 9.6 oz)   BMI 36.87 kg/m²     Physical Exam  Vitals reviewed.   Cardiovascular:      Heart sounds: Normal heart sounds, S1 normal and S2 normal. No murmur heard.     No friction rub.   Pulmonary:      Effort: Pulmonary effort is normal.      Breath sounds: Normal breath sounds and air entry.   Abdominal:      Palpations: There is no hepatomegaly, splenomegaly or mass.   Musculoskeletal:      Right lower leg: No edema.      Left lower leg: No edema.   Lymphadenopathy:      Lower Body: No right inguinal adenopathy. No left inguinal adenopathy.   Neurological:      Cranial Nerves: Cranial nerves 2-12 are intact.      Sensory: No sensory deficit.      Motor: Motor function is intact.      Deep Tendon Reflexes: Reflexes are normal and symmetric.     LAB WORK:  Laboratory testing discussed.    Assessment/Plan   Problem List Items Addressed This Visit           ICD-10-CM    Hemochromatosis E83.119     Other Visit Diagnoses         Codes      Type 2 diabetes mellitus without complication, without long-term current use of insulin    -  Primary E11.9    Relevant Medications    semaglutide (OZEMPIC) 1 mg/dose (4 mg/3 mL) pen injector        1. Type 2 diabetes and weight.  Ozempic " helping.  I bumped her dose to 1 mg.  Question of Dexcom versus Opal 3, whatever insurance covers.  2. Hemochromatosis.  She is seeing blood doctor.  Things are okay.  She wants to monitor.  3. Blood work in two months and see for a physical.  4. The patient told me that she gets episode of low sugar with hemochromatosis and weight loss program. I think that is a good idea.  It could be a lifesaver.  I strongly recommend her.    Scribe Attestation  By signing my name below, I, Saima Azar attest that this documentation has been prepared under the direction and in the presence of Kingston Beckett MD.

## 2025-06-11 ENCOUNTER — TELEPHONE (OUTPATIENT)
Dept: HEMATOLOGY/ONCOLOGY | Facility: HOSPITAL | Age: 50
End: 2025-06-11

## 2025-06-12 ENCOUNTER — TELEPHONE (OUTPATIENT)
Dept: HEMATOLOGY/ONCOLOGY | Facility: CLINIC | Age: 50
End: 2025-06-12
Payer: COMMERCIAL

## 2025-06-12 DIAGNOSIS — K86.2 CYSTIC MASS OF PANCREAS (HHS-HCC): Primary | ICD-10-CM

## 2025-06-12 NOTE — TELEPHONE ENCOUNTER
Called and LM for patient and asked for her to call office back.  Per Dr. Pabon MRI is approved but can not be done at a  Facility per patients insurance, they are asking for it to be scheduled a covered facility. I would like to help assist patient is getting this scheduled and fax the order to appropriate facility.  I provided my name and contact information for patient.

## 2025-06-12 NOTE — TELEPHONE ENCOUNTER
Pt returned call to office and information regarding MRI was given  Pt will contact office when she has chosen a location

## 2025-06-27 ENCOUNTER — APPOINTMENT (OUTPATIENT)
Dept: RADIOLOGY | Facility: CLINIC | Age: 50
End: 2025-06-27
Payer: COMMERCIAL

## 2025-08-08 ENCOUNTER — APPOINTMENT (OUTPATIENT)
Dept: PRIMARY CARE | Facility: CLINIC | Age: 50
End: 2025-08-08
Payer: COMMERCIAL

## 2025-09-12 ENCOUNTER — APPOINTMENT (OUTPATIENT)
Dept: PRIMARY CARE | Facility: CLINIC | Age: 50
End: 2025-09-12
Payer: COMMERCIAL

## 2025-11-24 ENCOUNTER — APPOINTMENT (OUTPATIENT)
Dept: RADIOLOGY | Facility: CLINIC | Age: 50
End: 2025-11-24
Payer: COMMERCIAL